# Patient Record
Sex: FEMALE | Race: WHITE | NOT HISPANIC OR LATINO | URBAN - METROPOLITAN AREA
[De-identification: names, ages, dates, MRNs, and addresses within clinical notes are randomized per-mention and may not be internally consistent; named-entity substitution may affect disease eponyms.]

---

## 2020-07-25 ENCOUNTER — TELEPHONE ENCOUNTER (OUTPATIENT)
Dept: URBAN - METROPOLITAN AREA CLINIC 13 | Facility: CLINIC | Age: 60
End: 2020-07-25

## 2020-07-25 RX ORDER — POLYETHYLENE GLYCOL 3350, SODIUM SULFATE, SODIUM CHLORIDE, POTASSIUM CHLORIDE, ASCORBIC ACID, SODIUM ASCORBATE 7.5-2.691G
USE AS DIRECTED KIT ORAL
Qty: 1 | Refills: 0 | OUTPATIENT
Start: 2014-01-27 | End: 2014-02-20

## 2020-07-26 ENCOUNTER — TELEPHONE ENCOUNTER (OUTPATIENT)
Dept: URBAN - METROPOLITAN AREA CLINIC 13 | Facility: CLINIC | Age: 60
End: 2020-07-26

## 2020-07-26 RX ORDER — ALPRAZOLAM 0.25 MG/1
TABLET ORAL
Qty: 90 | Refills: 0 | Status: ACTIVE | COMMUNITY
Start: 2014-02-20

## 2023-09-27 ENCOUNTER — WEB ENCOUNTER (OUTPATIENT)
Dept: URBAN - METROPOLITAN AREA CLINIC 113 | Facility: CLINIC | Age: 63
End: 2023-09-27

## 2023-09-28 ENCOUNTER — CLAIMS CREATED FROM THE CLAIM WINDOW (OUTPATIENT)
Dept: URBAN - METROPOLITAN AREA CLINIC 113 | Facility: CLINIC | Age: 63
End: 2023-09-28
Payer: COMMERCIAL

## 2023-09-28 VITALS
RESPIRATION RATE: 18 BRPM | SYSTOLIC BLOOD PRESSURE: 122 MMHG | WEIGHT: 115 LBS | HEIGHT: 61 IN | HEART RATE: 85 BPM | DIASTOLIC BLOOD PRESSURE: 86 MMHG | TEMPERATURE: 97.8 F | BODY MASS INDEX: 21.71 KG/M2

## 2023-09-28 DIAGNOSIS — R09.89 THROAT CLEARING: ICD-10-CM

## 2023-09-28 DIAGNOSIS — R12 HEARTBURN: ICD-10-CM

## 2023-09-28 DIAGNOSIS — R10.13 EPIGASTRIC PAIN: ICD-10-CM

## 2023-09-28 DIAGNOSIS — R09.89 GLOBUS SENSATION: ICD-10-CM

## 2023-09-28 DIAGNOSIS — Z12.11 COLON CANCER SCREENING: ICD-10-CM

## 2023-09-28 PROCEDURE — 99244 OFF/OP CNSLTJ NEW/EST MOD 40: CPT

## 2023-09-28 PROCEDURE — 99204 OFFICE O/P NEW MOD 45 MIN: CPT

## 2023-09-28 RX ORDER — CHROMIUM 200 MCG
1 TABLET TABLET ORAL ONCE A DAY
Status: ACTIVE | COMMUNITY

## 2023-09-28 RX ORDER — CETIRIZINE HYDROCHLORIDE 10 MG/1
1 TABLET TABLET, FILM COATED ORAL ONCE A DAY
Status: ACTIVE | COMMUNITY

## 2023-09-28 RX ORDER — FLUTICASONE PROPIONATE 50 UG/1
1 SPRAY IN EACH NOSTRIL SPRAY, METERED NASAL ONCE A DAY
Status: ACTIVE | COMMUNITY

## 2023-09-28 RX ORDER — ALPRAZOLAM 0.25 MG/1
1 TABLET TABLET ORAL AT BEDTIME
Refills: 0 | Status: ACTIVE | COMMUNITY
Start: 2014-02-20

## 2023-09-28 NOTE — HPI-TODAY'S VISIT:
62 year old female is referred by Dr. Anaya Chauhan for routine colon cancer screening. A copy of today's visit will be forwarded to the referring provider.   Per referral, US ordered for RUQ pain showed possible small gallstones and incidental kidney stone. A CT and HIDA scan have been ordered.   RUQ US 1/19/2023: Cholelithiasis without evidence of cholecystitis. Possible angiomyolipoma in the anterior mid cortex of the right renal. Further evaluation.   Labs 1/12/2023: normal CMP, total cholesterol 234, normal TSH, normal vitamin D, Hgb A1c 5.7, unremarkable CBC,.  Colonoscopy on 2/20/2014 with Dr. Martinez: Colonoscopy was somewhat difficult due to significant looping. Successful completion of procedure was aided by applying abdominal pressure. Bowel prep was good except the cecum was fair. Non thrombosed external hemorrhoids on perianal exam. TI was normal. Removal of two 2-4 mm benign hyperplastic polyps in the transverse colon and hepatic flexure. Descending diverticulosis. Non-bleeding internal hemorrhoids. Repeat in 5 years due to suboptimal prep only.  She does wish to switch to Dr. Florentino. She has heard good things about him. She does have lactose intolerance. She does admit to excess throat clearing and mucus buildup in her throat. She does feel something sitting in her throat. She will sometimes cough to the point of almost vomiting. She does have occasional heartburn. She has tried low dose Omeprazole in the past. This was ineffective. Increasing to 40 mg was also ineffective. She is followed by Dr. Mina for asthma. She states her lungs were clear on last exam. Asthma is well controlled.  She does have postprandial epigastric pain that radiates to her back. This is more common with high protein or high fat. She thinks she had a HIDA scan which was normal. Her CT a/p was normal as well. Bowel movements are regular and without blood. She vomited up her last bowel prep which is why it was inadequate.

## 2023-10-05 ENCOUNTER — TELEPHONE ENCOUNTER (OUTPATIENT)
Dept: URBAN - METROPOLITAN AREA CLINIC 113 | Facility: CLINIC | Age: 63
End: 2023-10-05

## 2023-10-05 RX ORDER — FAMOTIDINE 40 MG/1
1 TABLET AT BEDTIME TABLET, FILM COATED ORAL ONCE A DAY
Qty: 90 TABLET | Refills: 2 | OUTPATIENT
Start: 2023-10-05

## 2023-10-20 ENCOUNTER — CLAIMS CREATED FROM THE CLAIM WINDOW (OUTPATIENT)
Dept: URBAN - METROPOLITAN AREA CLINIC 4 | Facility: CLINIC | Age: 63
End: 2023-10-20
Payer: COMMERCIAL

## 2023-10-20 ENCOUNTER — OFFICE VISIT (OUTPATIENT)
Dept: URBAN - METROPOLITAN AREA SURGERY CENTER 25 | Facility: SURGERY CENTER | Age: 63
End: 2023-10-20

## 2023-10-20 ENCOUNTER — OUT OF OFFICE VISIT (OUTPATIENT)
Dept: URBAN - METROPOLITAN AREA SURGERY CENTER 25 | Facility: SURGERY CENTER | Age: 63
End: 2023-10-20
Payer: COMMERCIAL

## 2023-10-20 DIAGNOSIS — R09.A2 GLOBUS SENSATION: ICD-10-CM

## 2023-10-20 DIAGNOSIS — K31.89 OTHER DISEASES OF STOMACH AND DUODENUM: ICD-10-CM

## 2023-10-20 DIAGNOSIS — K31.7 POLYP OF STOMACH AND DUODENUM: ICD-10-CM

## 2023-10-20 DIAGNOSIS — K21.9 GASTROESOPHAGEAL REFLUX DISEASE: ICD-10-CM

## 2023-10-20 DIAGNOSIS — K29.50 CHRONIC GASTRITIS WITHOUT BLEEDING: ICD-10-CM

## 2023-10-20 DIAGNOSIS — R10.13 EPIGASTRIC ABDOMINAL PAIN: ICD-10-CM

## 2023-10-20 DIAGNOSIS — K22.81 ESOPHAGEAL POLYP: ICD-10-CM

## 2023-10-20 DIAGNOSIS — K21.9 GASTRO-ESOPHAGEAL REFLUX DISEASE WITHOUT ESOPHAGITIS: ICD-10-CM

## 2023-10-20 DIAGNOSIS — K29.70 GASTRITIS, UNSPECIFIED, WITHOUT BLEEDING: ICD-10-CM

## 2023-10-20 DIAGNOSIS — K22.9 IRREGULAR Z LINE OF ESOPHAGUS: ICD-10-CM

## 2023-10-20 PROCEDURE — 88312 SPECIAL STAINS GROUP 1: CPT | Performed by: PATHOLOGY

## 2023-10-20 PROCEDURE — 00731 ANES UPR GI NDSC PX NOS: CPT | Performed by: ANESTHESIOLOGY

## 2023-10-20 PROCEDURE — 43239 EGD BIOPSY SINGLE/MULTIPLE: CPT | Performed by: INTERNAL MEDICINE

## 2023-10-20 PROCEDURE — 88342 IMHCHEM/IMCYTCHM 1ST ANTB: CPT | Performed by: PATHOLOGY

## 2023-10-20 PROCEDURE — G8907 PT DOC NO EVENTS ON DISCHARG: HCPCS | Performed by: INTERNAL MEDICINE

## 2023-10-20 PROCEDURE — 88305 TISSUE EXAM BY PATHOLOGIST: CPT | Performed by: PATHOLOGY

## 2023-10-20 RX ORDER — CHROMIUM 200 MCG
1 TABLET TABLET ORAL ONCE A DAY
Status: ACTIVE | COMMUNITY

## 2023-10-20 RX ORDER — ALPRAZOLAM 0.25 MG/1
1 TABLET TABLET ORAL AT BEDTIME
Refills: 0 | Status: ACTIVE | COMMUNITY
Start: 2014-02-20

## 2023-10-20 RX ORDER — FLUTICASONE PROPIONATE 50 UG/1
1 SPRAY IN EACH NOSTRIL SPRAY, METERED NASAL ONCE A DAY
Status: ACTIVE | COMMUNITY

## 2023-10-20 RX ORDER — CETIRIZINE HYDROCHLORIDE 10 MG/1
1 TABLET TABLET, FILM COATED ORAL ONCE A DAY
Status: ACTIVE | COMMUNITY

## 2023-10-20 RX ORDER — FAMOTIDINE 40 MG/1
1 TABLET AT BEDTIME TABLET, FILM COATED ORAL ONCE A DAY
Qty: 90 TABLET | Refills: 2 | Status: ACTIVE | COMMUNITY
Start: 2023-10-05

## 2023-11-16 ENCOUNTER — CLAIMS CREATED FROM THE CLAIM WINDOW (OUTPATIENT)
Dept: URBAN - METROPOLITAN AREA SURGERY CENTER 25 | Facility: SURGERY CENTER | Age: 63
End: 2023-11-16
Payer: COMMERCIAL

## 2023-11-16 ENCOUNTER — CLAIMS CREATED FROM THE CLAIM WINDOW (OUTPATIENT)
Dept: URBAN - METROPOLITAN AREA CLINIC 4 | Facility: CLINIC | Age: 63
End: 2023-11-16
Payer: COMMERCIAL

## 2023-11-16 ENCOUNTER — CLAIMS CREATED FROM THE CLAIM WINDOW (OUTPATIENT)
Dept: URBAN - METROPOLITAN AREA SURGERY CENTER 25 | Facility: SURGERY CENTER | Age: 63
End: 2023-11-16

## 2023-11-16 DIAGNOSIS — K57.30 COLON, DIVERTICULOSIS: ICD-10-CM

## 2023-11-16 DIAGNOSIS — D12.3 ADENOMA OF TRANSVERSE COLON: ICD-10-CM

## 2023-11-16 DIAGNOSIS — K63.5 BENIGN COLON POLYPS: ICD-10-CM

## 2023-11-16 DIAGNOSIS — Z12.11 COLON CANCER SCREENING (HIGH RISK): ICD-10-CM

## 2023-11-16 DIAGNOSIS — K64.0 FIRST DEGREE HEMORRHOIDS: ICD-10-CM

## 2023-11-16 DIAGNOSIS — D12.5 ADENOMA OF SIGMOID COLON: ICD-10-CM

## 2023-11-16 DIAGNOSIS — K63.89 OTHER SPECIFIED DISEASES OF INTESTINE: ICD-10-CM

## 2023-11-16 DIAGNOSIS — Z12.11 COLON CANCER SCREENING: ICD-10-CM

## 2023-11-16 PROCEDURE — 00811 ANES LWR INTST NDSC NOS: CPT | Performed by: ANESTHESIOLOGIST ASSISTANT

## 2023-11-16 PROCEDURE — 88305 TISSUE EXAM BY PATHOLOGIST: CPT | Performed by: PATHOLOGY

## 2023-11-16 PROCEDURE — 45385 COLONOSCOPY W/LESION REMOVAL: CPT | Performed by: INTERNAL MEDICINE

## 2023-11-16 PROCEDURE — 00811 ANES LWR INTST NDSC NOS: CPT | Performed by: ANESTHESIOLOGY

## 2023-11-16 PROCEDURE — G8907 PT DOC NO EVENTS ON DISCHARG: HCPCS | Performed by: INTERNAL MEDICINE

## 2023-11-16 RX ORDER — FLUTICASONE PROPIONATE 50 UG/1
1 SPRAY IN EACH NOSTRIL SPRAY, METERED NASAL ONCE A DAY
Status: ACTIVE | COMMUNITY

## 2023-11-16 RX ORDER — FAMOTIDINE 40 MG/1
1 TABLET AT BEDTIME TABLET, FILM COATED ORAL ONCE A DAY
Qty: 90 TABLET | Refills: 2 | Status: ACTIVE | COMMUNITY
Start: 2023-10-05

## 2023-11-16 RX ORDER — ALPRAZOLAM 0.25 MG/1
1 TABLET TABLET ORAL AT BEDTIME
Refills: 0 | Status: ACTIVE | COMMUNITY
Start: 2014-02-20

## 2023-11-16 RX ORDER — CHROMIUM 200 MCG
1 TABLET TABLET ORAL ONCE A DAY
Status: ACTIVE | COMMUNITY

## 2023-11-16 RX ORDER — CETIRIZINE HYDROCHLORIDE 10 MG/1
1 TABLET TABLET, FILM COATED ORAL ONCE A DAY
Status: ACTIVE | COMMUNITY

## 2023-12-06 ENCOUNTER — OFFICE VISIT (OUTPATIENT)
Dept: URBAN - METROPOLITAN AREA CLINIC 113 | Facility: CLINIC | Age: 63
End: 2023-12-06
Payer: COMMERCIAL

## 2023-12-06 VITALS
RESPIRATION RATE: 18 BRPM | TEMPERATURE: 97.1 F | DIASTOLIC BLOOD PRESSURE: 86 MMHG | HEART RATE: 83 BPM | WEIGHT: 115 LBS | SYSTOLIC BLOOD PRESSURE: 139 MMHG | HEIGHT: 61 IN | BODY MASS INDEX: 21.71 KG/M2

## 2023-12-06 DIAGNOSIS — R09.89 THROAT CLEARING: ICD-10-CM

## 2023-12-06 DIAGNOSIS — R09.89 GLOBUS SENSATION: ICD-10-CM

## 2023-12-06 DIAGNOSIS — R10.13 EPIGASTRIC PAIN: ICD-10-CM

## 2023-12-06 DIAGNOSIS — R12 HEARTBURN: ICD-10-CM

## 2023-12-06 DIAGNOSIS — Z12.11 COLON CANCER SCREENING: ICD-10-CM

## 2023-12-06 DIAGNOSIS — R12 BURNING REFLUX: ICD-10-CM

## 2023-12-06 PROCEDURE — 99214 OFFICE O/P EST MOD 30 MIN: CPT

## 2023-12-06 RX ORDER — RABEPRAZOLE SODIUM 20 MG/1
1 TABLET TABLET, DELAYED RELEASE ORAL ONCE A DAY
Qty: 90 TABLET | Refills: 1 | OUTPATIENT
Start: 2023-12-06

## 2023-12-06 RX ORDER — CHROMIUM 200 MCG
1 TABLET TABLET ORAL ONCE A DAY
Status: ACTIVE | COMMUNITY

## 2023-12-06 RX ORDER — ALPRAZOLAM 0.25 MG/1
1 TABLET TABLET ORAL AT BEDTIME
Refills: 0 | Status: ACTIVE | COMMUNITY
Start: 2014-02-20

## 2023-12-06 RX ORDER — FLUTICASONE PROPIONATE 50 UG/1
1 SPRAY IN EACH NOSTRIL SPRAY, METERED NASAL ONCE A DAY
Status: ACTIVE | COMMUNITY

## 2023-12-06 RX ORDER — CETIRIZINE HYDROCHLORIDE 10 MG/1
1 TABLET TABLET, FILM COATED ORAL ONCE A DAY
Status: ACTIVE | COMMUNITY

## 2023-12-06 RX ORDER — FAMOTIDINE 40 MG/1
1 TABLET AT BEDTIME TABLET, FILM COATED ORAL ONCE A DAY
Qty: 90 TABLET | Refills: 2 | Status: ACTIVE | COMMUNITY
Start: 2023-10-05

## 2023-12-06 NOTE — PHYSICAL EXAM NECK/THYROID:
Health Maintenance Summary     Topic Due On Due Status Completed On Postpone Until Reason    MAMMOGRAM - BREAST CANCER SCREENING May 1, 2017 Postponed May 1, 2015 Oct 4, 2017 Patient Refused    Colorectal Cancer Screening - Colonoscopy Apr 16, 2019 Not Due Apr 16, 2009      Osteoporosis Screening  Completed Dec 5, 2014      Immunization-Zoster Feb 4, 2005 Postponed  Oct 4, 2017 Insurance or Financial    Immunization - Pneumococcal  Completed Nov 19, 2015      Immunization - TDAP Pregnancy  Hidden       Medicare Wellness Visit Dec 22, 2016 Postponed Dec 22, 2015 Oct 4, 2017 Patient Refused    IMMUNIZATION - DTaP/Tdap/Td Jan 2, 2009 Postponed Jan 1, 2009 Nov 30, 2017 Insurance or Financial    Immunization-Influenza Sep 1, 2017 Postponed Oct 21, 2016 Oct 4, 2017 Patient Refused          Patient is due for topics as listed above, she wishes to discuss with provider .  Patient due for MWV and Breast cancer screen.     normal appearance

## 2023-12-06 NOTE — HPI-TODAY'S VISIT:
63 year old female presenting for follow-up after screening colonoscopy and EGD for epigastric pain.   She was last seen in office on 09/28/2023 with complaints of epigastric pain and heartburn despite Omeprazole 40 mg PO daily.  Per her referral, US ordered for RUQ pain showed possible small gallstones and incidental kidney stone. A CT and HIDA scan have been ordered. She was offered referral to general surgery but declined. She also had complaints of constant throat clearing that has been ongoing for many years. She has a history of asthma and is followed by Dr. Mina. She stated her lungs were clear on last exam. Due to her symptoms an EGD was recommended and she was also scheduled for repeat screening colonosocpy as she was past due.  Her EGD performed on 10/20/2023 revealed subepithelial nodule (7 mm) found in the esophagus. Benign appearing. Nonerosive gastropathy. Multiple gastric polyps. Pathology revealed chemical/reactive gastropathy of the stomach antrum and body biopsies, chronic gastritis of the stomach body biopsy. The upper third esophagus biopsy showed squamous mucosa with reflux-type changes; no columnar mucosa identified. No evidence of Westfall's esophagus or eosinophilic esophagitis. Negative for infectious organisms.  Her Colonoscopy performed on 11/16/2023 revealed excellent bowel prep. Three 4 to 5 mm polyps in the sigmoid colon, the transverse colon, and the ascending colon. Diverticulosis in the sigmoid colon and ascending colon. Non-bleeding internal hemorrhoids, grade I. Pathology revealed hypeplastic polyps and tubular adenomas. Recommend repeat surveillance colonoscopy in 3 years.  Today she presents with similar complaints. Her main concern is the chronic throat clearing. She reports the epigastric pain as more of a discomfort with occasional bloating. She feels it is improved with dietary modifications. There is no dysphagia, heartburn, regurgitation, unintentional weight loss, nausea, vomiting, hematemesis or melena. Bowels are moving regularly without blood per rectum. No jaundice, icterus.

## 2023-12-06 NOTE — HPI-OTHER HISTORIES
RUQ US 1/19/2023: Cholelithiasis without evidence of cholecystitis. Possible angiomyolipoma in the anterior mid cortex of the right renal. Further evaluation.  Labs 1/12/2023: normal CMP, total cholesterol 234, normal TSH, normal vitamin D, Hgb A1c 5.7, unremarkable CBC,.  Colonoscopy on 2/20/2014 with Dr. Martinez: Colonoscopy was somewhat difficult due to significant looping. Successful completion of procedure was aided by applying abdominal pressure. Bowel prep was good except the cecum was fair. Non thrombosed external hemorrhoids on perianal exam. TI was normal. Removal of two 2-4 mm benign hyperplastic polyps in the transverse colon and hepatic flexure. Descending diverticulosis. Non-bleeding internal hemorrhoids. Repeat in 5 years due to suboptimal prep only.

## 2024-03-04 ENCOUNTER — OV EP (OUTPATIENT)
Dept: URBAN - METROPOLITAN AREA CLINIC 113 | Facility: CLINIC | Age: 64
End: 2024-03-04
Payer: COMMERCIAL

## 2024-03-04 VITALS
SYSTOLIC BLOOD PRESSURE: 107 MMHG | DIASTOLIC BLOOD PRESSURE: 79 MMHG | WEIGHT: 118.6 LBS | RESPIRATION RATE: 18 BRPM | HEIGHT: 61 IN | HEART RATE: 59 BPM | TEMPERATURE: 96.9 F | BODY MASS INDEX: 22.39 KG/M2

## 2024-03-04 DIAGNOSIS — K21.9 GERD WITHOUT ESOPHAGITIS: ICD-10-CM

## 2024-03-04 DIAGNOSIS — R05.3 CHRONIC COUGH: ICD-10-CM

## 2024-03-04 PROCEDURE — 99213 OFFICE O/P EST LOW 20 MIN: CPT | Performed by: INTERNAL MEDICINE

## 2024-03-04 RX ORDER — RABEPRAZOLE SODIUM 20 MG/1
1 TABLET TABLET, DELAYED RELEASE ORAL ONCE A DAY
OUTPATIENT
Start: 2023-12-06

## 2024-03-04 RX ORDER — FLUTICASONE PROPIONATE 50 UG/1
1 SPRAY IN EACH NOSTRIL SPRAY, METERED NASAL ONCE A DAY
Status: ACTIVE | COMMUNITY

## 2024-03-04 RX ORDER — CHROMIUM 200 MCG
1 TABLET TABLET ORAL ONCE A DAY
Status: ACTIVE | COMMUNITY

## 2024-03-04 RX ORDER — RABEPRAZOLE SODIUM 20 MG/1
1 TABLET TABLET, DELAYED RELEASE ORAL ONCE A DAY
Qty: 90 TABLET | Refills: 1 | Status: ACTIVE | COMMUNITY
Start: 2023-12-06

## 2024-03-04 RX ORDER — FAMOTIDINE 40 MG/1
1 TABLET AT BEDTIME TABLET, FILM COATED ORAL ONCE A DAY
Qty: 90 TABLET | Refills: 2 | Status: ON HOLD | COMMUNITY
Start: 2023-10-05

## 2024-03-04 RX ORDER — ALPRAZOLAM 0.25 MG/1
1 TABLET TABLET ORAL AT BEDTIME
Refills: 0 | Status: ACTIVE | COMMUNITY
Start: 2014-02-20

## 2024-03-04 RX ORDER — FAMOTIDINE 40 MG/1
1 TABLET AT BEDTIME TABLET, FILM COATED ORAL ONCE A DAY
Start: 2023-10-05

## 2024-03-04 RX ORDER — LEVOCETIRIZINE DIHYDROCHLORIDE 5 MG/1
1 TABLET IN THE EVENING TABLET, FILM COATED ORAL ONCE A DAY
Qty: 30 | OUTPATIENT
Start: 2024-03-21 | End: 2024-04-20

## 2024-03-04 RX ORDER — CETIRIZINE HYDROCHLORIDE 10 MG/1
1 TABLET TABLET, FILM COATED ORAL ONCE A DAY
Status: ON HOLD | COMMUNITY

## 2024-03-04 NOTE — HPI-TODAY'S VISIT:
Ms. Araiza is a 63 year old presenting for follow up regarding GERD and persistent throat clearing.  She was last seen on 12/6/2023 for follow-up regarding GERD status post EGD (10/20/2023) was notable for subepithelial lesion in the esophagus.  To be benign, multiple gastric polyps and gastric erythema status post biopsies showing chemical gastritis negative for H. pylori and normal duodenum.  She also had a colonoscopy (11/16/2023) notable for ascending and sigmoid colon diverticulosis and removal of 3 small colon polyps that were a mixture of hyperplastic polyps and tubular adenomas, and grade 1 internal hemorrhoids.  She was seen by Dr. Wade, ENT, and reportedly underwent a negative nasopharyngoscopy.  She returns reporting that she is having frequent throat clearing and a nonproductive cough "all the time".  Her symptoms wax and wane and seem to be exacerbated by emotional stressors.  She has been on multiple antihistamines without improvement.  She was prescribed rabeprazole at her last visit but is not sure that it has been helpful.  She believes her symptoms improved when she was visiting [place].  She did not try previously prescribed Xyzal.  She denies any dysphagia.  Her bowel habits are regular normal consistency with no red blood per rectum.

## 2024-03-04 NOTE — HPI-OTHER HISTORIES
EGD (10/20/2023): revealed subepithelial nodule (7 mm) found in the esophagus. Benign appearing. Nonerosive gastropathy. Multiple gastric polyps. Pathology revealed chemical/reactive gastropathy of the stomach antrum and body biopsies, chronic gastritis. The upper third esophagus biopsy showed squamous mucosa with reflux-type changes; no columnar mucosa identified. No evidence of Westfall's esophagus or eosinophilic esophagitis. Negative for infectious organisms.  Colonoscopy (11/16/2023): revealed excellent bowel prep. Three 4 to 5 mm polyps in the sigmoid colon, the transverse colon, and the ascending colon. Diverticulosis in the sigmoid colon and ascending colon. Non-bleeding internal hemorrhoids, grade I. Pathology revealed hypeplastic polyps and tubular adenomas. Recommend repeat surveillance colonoscopy in 3 years. RUQ US 1/19/2023: Cholelithiasis without evidence of cholecystitis. Possible angiomyolipoma in the anterior mid cortex of the right renal. Further evaluation. Labs 1/12/2023: normal CMP, total cholesterol 234, normal TSH, normal vitamin D, Hgb A1c 5.7, unremarkable CBC,. Colonoscopy on 2/20/2014 with Dr. Martinez: Colonoscopy was somewhat difficult due to significant looping. Successful completion of procedure was aided by applying abdominal pressure. Bowel prep was good except the cecum was fair. Non thrombosed external hemorrhoids on perianal exam. TI was normal. Removal of two 2-4 mm benign hyperplastic polyps in the transverse colon and hepatic flexure. Descending diverticulosis. Non-bleeding internal hemorrhoids. Repeat in 5 years due to suboptimal prep only.

## 2024-03-21 PROBLEM — 266435005: Status: ACTIVE | Noted: 2024-03-21
